# Patient Record
(demographics unavailable — no encounter records)

---

## 2024-11-11 NOTE — DISCUSSION/SUMMARY
[de-identified] : Reviewed the available imaging compared with prior imaging with the patient and her father today.  Also explained my impression that her gait disturbance is likely muscular in nature as have not identified any neurologic abnormalities today on physical examination.  We are in agreement to proceed with physical therapy for low back and core mobilization and strengthening as well as some attention to the hip and overall gait mechanics.  She will keep us informed of her progress  I have spent greater than 60 minutes preparing to see the patient, collecting relevant history, performing a thorough history and physical examination, counseling the patient regarding my findings ordering the appropriate therapies and tests, communicating with other relevant healthcare professionals, documenting my encounter and coordinating care.

## 2024-11-11 NOTE — PHYSICAL EXAM
[UE/LE] : Sensory: Intact in bilateral upper & lower extremities [Normal DTR Reflexes] : DTR reflexes normal [Normal Proprioception] : sensation intact for proprioception [Normal] : Oriented to person, place, and time, insight and judgement were intact and the affect was normal [de-identified] : Able to heel toe tandem gait [de-identified] : AP lateral scoliosis x-ray 11/11/2024 PACS Status post T3-L3 spinal fusion without apparent radiographic complication or adjacent segment degeneration

## 2024-11-11 NOTE — HISTORY OF PRESENT ILLNESS
[de-identified] : 21-year-old female presents as new patient for evaluation of gait abnormality.  She has a history of T3-L3 posterior spinal fusion for AIS with Dr. Mejias performed in 2018.  Since that time she describes some numbness around the incision that initially improved somewhat however has been relatively constant for several years.  Denies any pain at the surgical site though occasionally some low back soreness after a long day at work.  She works in the [employer] industry and is frequently lifting boxes and moving about on her feet.  Denies any radiating pain numbness tingling or weakness in the extremities.  She does feel that her gait is somewhat deliberate in the sense that she needs to actively elevate her toes to allow for a meggan that is nonfasting.